# Patient Record
Sex: MALE | Race: OTHER | HISPANIC OR LATINO | URBAN - METROPOLITAN AREA
[De-identification: names, ages, dates, MRNs, and addresses within clinical notes are randomized per-mention and may not be internally consistent; named-entity substitution may affect disease eponyms.]

---

## 2017-03-30 ENCOUNTER — EMERGENCY (EMERGENCY)
Facility: HOSPITAL | Age: 62
LOS: 1 days | Discharge: HOME | End: 2017-03-30
Attending: EMERGENCY MEDICINE | Admitting: EMERGENCY MEDICINE
Payer: COMMERCIAL

## 2017-03-30 VITALS
DIASTOLIC BLOOD PRESSURE: 74 MMHG | HEART RATE: 74 BPM | RESPIRATION RATE: 18 BRPM | TEMPERATURE: 98 F | SYSTOLIC BLOOD PRESSURE: 136 MMHG | OXYGEN SATURATION: 98 %

## 2017-03-30 VITALS
SYSTOLIC BLOOD PRESSURE: 156 MMHG | DIASTOLIC BLOOD PRESSURE: 80 MMHG | TEMPERATURE: 97 F | HEART RATE: 80 BPM | RESPIRATION RATE: 16 BRPM | OXYGEN SATURATION: 100 %

## 2017-03-30 DIAGNOSIS — R07.89 OTHER CHEST PAIN: ICD-10-CM

## 2017-03-30 DIAGNOSIS — R53.1 WEAKNESS: ICD-10-CM

## 2017-03-30 LAB
APTT BLD: 26 SEC — LOW (ref 27.5–37.4)
CK SERPL-CCNC: 87 U/L — SIGNIFICANT CHANGE UP (ref 39–308)
EXTRA SST TUBE: SIGNIFICANT CHANGE UP
TROPONIN I SERPL-MCNC: <0.015 NG/ML — SIGNIFICANT CHANGE UP (ref 0.01–0.04)

## 2017-03-30 PROCEDURE — 85730 THROMBOPLASTIN TIME PARTIAL: CPT

## 2017-03-30 PROCEDURE — 80053 COMPREHEN METABOLIC PANEL: CPT

## 2017-03-30 PROCEDURE — 99284 EMERGENCY DEPT VISIT MOD MDM: CPT | Mod: 25

## 2017-03-30 PROCEDURE — 99285 EMERGENCY DEPT VISIT HI MDM: CPT | Mod: 25

## 2017-03-30 PROCEDURE — 84484 ASSAY OF TROPONIN QUANT: CPT

## 2017-03-30 PROCEDURE — 71010: CPT | Mod: 26

## 2017-03-30 PROCEDURE — 93005 ELECTROCARDIOGRAM TRACING: CPT

## 2017-03-30 PROCEDURE — 82550 ASSAY OF CK (CPK): CPT

## 2017-03-30 PROCEDURE — 71045 X-RAY EXAM CHEST 1 VIEW: CPT

## 2017-03-30 PROCEDURE — 93010 ELECTROCARDIOGRAM REPORT: CPT

## 2017-03-30 PROCEDURE — 36415 COLL VENOUS BLD VENIPUNCTURE: CPT

## 2017-03-30 PROCEDURE — 85025 COMPLETE CBC W/AUTO DIFF WBC: CPT

## 2017-03-30 RX ORDER — SODIUM CHLORIDE 9 MG/ML
1000 INJECTION INTRAMUSCULAR; INTRAVENOUS; SUBCUTANEOUS ONCE
Qty: 0 | Refills: 0 | Status: COMPLETED | OUTPATIENT
Start: 2017-03-30 | End: 2017-03-30

## 2017-03-30 RX ADMIN — SODIUM CHLORIDE 1000 MILLILITER(S): 9 INJECTION INTRAMUSCULAR; INTRAVENOUS; SUBCUTANEOUS at 19:34

## 2017-03-30 NOTE — ED ADULT NURSE NOTE - CHPI ED SYMPTOMS NEG
no chills/no diaphoresis/no cough/no fever/no syncope/no vomiting/no back pain/no dizziness/no shortness of breath

## 2017-03-30 NOTE — ED ADULT NURSE NOTE - OBJECTIVE STATEMENT
pt aaox3. pt c/o chest tightness onset today at 10am. Pt took 2 aspirin prior to arrival. Other symptoms include weakness in bilateral knees, nausea, tingling in bilateral hands. Bilateral upper and lower extremities equal in strength and sensation intact. Pt denies PMH. Pt denies taking daily medications. pt aaox3. pt c/o chest tightness onset today at 10am. Pt took 2 aspirin prior to arrival. Other symptoms include weakness in bilateral knees and nausea. Bilateral upper and lower extremities equal in strength and sensation intact. Pt denies PMH. Last bowel movement this morning and states "soft." Pt denies taking daily medications.

## 2017-03-30 NOTE — ED PROVIDER NOTE - MEDICAL DECISION MAKING DETAILS
61y male with episode of weakness and chest tightness while on his computer at work. EKG WNL. Labs including troponin WNL. Heart score 2 (low risk). 61y male with episode of weakness today, with brief episodes of nonexertional chest tightness. EKG WNL. Labs including troponin x2 WNL. Heart score 2 (low risk). Pt feeling better in ED with no symptoms during stay. Given cardiology f/u with Dr Simon on 4/5. D/w pt return precautions to which he voiced understanidng.

## 2017-03-30 NOTE — ED PROVIDER NOTE - OBJECTIVE STATEMENT
61y male no PMH c/o feeling weak while at work earlier today; a/w episode of chest tightness. States his wife has been ill lately and he now notes nausea, myalgias. 61y male no PMH c/o feeling weak while at work earlier today like his knees would give out. Able to sit down with resolution of symptoms. Reports some occasional chest tightness today as well. Nonexertional, nonradiating. States his wife has been ill lately and he now notes nausea, myalgias.

## 2017-03-30 NOTE — ED ADULT TRIAGE NOTE - CHIEF COMPLAINT QUOTE
Patient c/o of chest pain since this morning.  EKG in progress.  Patient took aspirins this morning, no relief.  Breathing appears easy and unlabored.

## 2022-06-19 NOTE — ED ADULT TRIAGE NOTE - ESI TRIAGE ACUITY LEVEL, MLM
Stroke Education Note    Patient: Anupama Lara Date: 2022   : 1957 Attending: Krysta Benitez MD       Patient admitted with Abnormal Diagnostic Test, Pain/ STAT Call 2022 @ 1800. Intervention none  Patient's uncontrollable risk factors areAge over 55 years (22 0722) . Patient's controllable risk factors  High blood pressure;High cholesterol;Irregular heart beat (Atrial Fib) (22 0722).    Stroke teaching deferred at this time. Unable to teach patient d/t pt off the floor for MRI of Lumbar Spine, Cervical Spine and Thoracic Spine. Will return at a later time to attempt education.    Kathy Shah, KAILASH  Stroke Nurse Navigator   2